# Patient Record
Sex: FEMALE | Race: WHITE | NOT HISPANIC OR LATINO
[De-identification: names, ages, dates, MRNs, and addresses within clinical notes are randomized per-mention and may not be internally consistent; named-entity substitution may affect disease eponyms.]

---

## 2017-08-17 PROBLEM — Z00.00 ENCOUNTER FOR PREVENTIVE HEALTH EXAMINATION: Status: ACTIVE | Noted: 2017-08-17

## 2017-09-19 ENCOUNTER — APPOINTMENT (OUTPATIENT)
Dept: SURGERY | Facility: CLINIC | Age: 62
End: 2017-09-19
Payer: COMMERCIAL

## 2017-09-19 VITALS
WEIGHT: 110 LBS | HEIGHT: 60 IN | SYSTOLIC BLOOD PRESSURE: 149 MMHG | BODY MASS INDEX: 21.6 KG/M2 | HEART RATE: 76 BPM | DIASTOLIC BLOOD PRESSURE: 98 MMHG

## 2017-09-19 DIAGNOSIS — R22.0 LOCALIZED SWELLING, MASS AND LUMP, HEAD: ICD-10-CM

## 2017-09-19 DIAGNOSIS — Z86.59 PERSONAL HISTORY OF OTHER MENTAL AND BEHAVIORAL DISORDERS: ICD-10-CM

## 2017-09-19 DIAGNOSIS — Z78.9 OTHER SPECIFIED HEALTH STATUS: ICD-10-CM

## 2017-09-19 PROCEDURE — 99243 OFF/OP CNSLTJ NEW/EST LOW 30: CPT

## 2017-09-19 RX ORDER — CLONAZEPAM 1 MG/1
1 TABLET ORAL
Qty: 90 | Refills: 0 | Status: ACTIVE | COMMUNITY
Start: 2017-08-10

## 2017-09-19 RX ORDER — FLUOXETINE HYDROCHLORIDE 20 MG/1
20 CAPSULE ORAL
Qty: 90 | Refills: 0 | Status: ACTIVE | COMMUNITY
Start: 2017-07-31

## 2018-03-15 ENCOUNTER — OTHER (OUTPATIENT)
Age: 63
End: 2018-03-15

## 2018-03-15 ENCOUNTER — APPOINTMENT (OUTPATIENT)
Dept: SURGERY | Facility: CLINIC | Age: 63
End: 2018-03-15

## 2019-11-04 ENCOUNTER — OTHER (OUTPATIENT)
Age: 64
End: 2019-11-04

## 2022-07-24 ENCOUNTER — NON-APPOINTMENT (OUTPATIENT)
Age: 67
End: 2022-07-24

## 2023-01-04 ENCOUNTER — OFFICE (OUTPATIENT)
Dept: URBAN - METROPOLITAN AREA CLINIC 90 | Facility: CLINIC | Age: 68
Setting detail: OPHTHALMOLOGY
End: 2023-01-04
Payer: MEDICARE

## 2023-01-04 DIAGNOSIS — H01.005: ICD-10-CM

## 2023-01-04 DIAGNOSIS — H35.363: ICD-10-CM

## 2023-01-04 DIAGNOSIS — H01.002: ICD-10-CM

## 2023-01-04 DIAGNOSIS — H01.001: ICD-10-CM

## 2023-01-04 DIAGNOSIS — H43.813: ICD-10-CM

## 2023-01-04 DIAGNOSIS — H16.222: ICD-10-CM

## 2023-01-04 DIAGNOSIS — H25.12: ICD-10-CM

## 2023-01-04 DIAGNOSIS — H01.004: ICD-10-CM

## 2023-01-04 PROCEDURE — 92134 CPTRZ OPH DX IMG PST SGM RTA: CPT | Performed by: OPHTHALMOLOGY

## 2023-01-04 PROCEDURE — 92014 COMPRE OPH EXAM EST PT 1/>: CPT | Performed by: OPHTHALMOLOGY

## 2023-01-04 ASSESSMENT — VISUAL ACUITY
OS_BCVA: 20/20-2
OD_BCVA: 20/100+1

## 2023-01-04 ASSESSMENT — TONOMETRY
OS_IOP_MMHG: 18
OD_IOP_MMHG: 18

## 2023-01-04 ASSESSMENT — REFRACTION_AUTOREFRACTION
OD_SPHERE: -0.25
OS_AXIS: 180
OD_CYLINDER: -0.75
OS_SPHERE: -2.00
OD_AXIS: 174
OS_CYLINDER: SPHERE

## 2023-01-04 ASSESSMENT — LID EXAM ASSESSMENTS
OD_BLEPHARITIS: 1+
OS_BLEPHARITIS: 1+

## 2023-01-04 ASSESSMENT — REFRACTION_MANIFEST
OS_CYLINDER: SPH
OS_VA1: 20/20
OD_CYLINDER: SPH
OS_SPHERE: -0.50
OD_VA1: 20/20-2
OD_SPHERE: -1.75
OS_AXIS: 100
OS_CYLINDER: SPHERE
OD_VA1: 20/60-2
OS_VA1: 20/20
OS_SPHERE: -1.50
OD_AXIS: 015
OS_VA1: 20/20
OD_VA1: 20/20
OS_SPHERE: -1.25
OD_SPHERE: -5.75
OD_CYLINDER: -1.25
OD_VA1: 20/25
OS_VA1: 20/25
OS_SPHERE: PLANO
OD_CYLINDER: -1.00
OD_SPHERE: -2.25
OD_AXIS: 175
OS_AXIS: 175
OD_AXIS: 100
OU_VA: 20/20
OS_CYLINDER: +0.50
OS_CYLINDER: -0.25
OD_SPHERE: -0.25
OD_CYLINDER: SPHERE

## 2023-01-04 ASSESSMENT — REFRACTION_CURRENTRX
OD_OVR_VA: 20/
OD_VPRISM_DIRECTION: SV
OS_SPHERE: -0.50
OS_CYLINDER: SPHERE
OS_OVR_VA: 20/
OS_OVR_VA: 20/
OS_VPRISM_DIRECTION: SV
OD_SPHERE: -2.00
OS_VPRISM_DIRECTION: SV
OD_CYLINDER: SPH
OS_CYLINDER: SPH
OD_OVR_VA: 20/
OS_SPHERE: +1.50
OD_CYLINDER: SPHERE
OD_SPHERE: +1.50
OD_VPRISM_DIRECTION: SV

## 2023-01-04 ASSESSMENT — KERATOMETRY
OD_AXISANGLE_DEGREES: 091
OS_K1POWER_DIOPTERS: 42.75
OD_K1POWER_DIOPTERS: 42.25
OS_K2POWER_DIOPTERS: 43.50
OS_AXISANGLE_DEGREES: 099
METHOD_AUTO_MANUAL: AUTO
OD_K2POWER_DIOPTERS: 43.50

## 2023-01-04 ASSESSMENT — CONFRONTATIONAL VISUAL FIELD TEST (CVF)
OS_FINDINGS: FULL
OD_FINDINGS: FULL

## 2023-01-04 ASSESSMENT — SPHEQUIV_DERIVED
OS_SPHEQUIV: -1.625
OD_SPHEQUIV: -0.875
OD_SPHEQUIV: -0.625
OD_SPHEQUIV: -6.25

## 2023-01-04 ASSESSMENT — AXIALLENGTH_DERIVED
OD_AL: 26.5958
OD_AL: 24.1765
OS_AL: 24.3888
OD_AL: 24.0747

## 2023-01-04 ASSESSMENT — SUPERFICIAL PUNCTATE KERATITIS (SPK): OS_SPK: T

## 2023-03-14 ENCOUNTER — OFFICE (OUTPATIENT)
Dept: URBAN - METROPOLITAN AREA CLINIC 90 | Facility: CLINIC | Age: 68
Setting detail: OPHTHALMOLOGY
End: 2023-03-14
Payer: MEDICARE

## 2023-03-14 DIAGNOSIS — H25.12: ICD-10-CM

## 2023-03-14 PROCEDURE — 92136 OPHTHALMIC BIOMETRY: CPT | Performed by: OPHTHALMOLOGY

## 2023-03-14 ASSESSMENT — AXIALLENGTH_DERIVED
OD_AL: 24.0327
OD_AL: 26.4219
OS_AL: 24.1941
OS_AL: 24.5054
OD_AL: 24.0327

## 2023-03-14 ASSESSMENT — REFRACTION_MANIFEST
OD_CYLINDER: SPH
OS_CYLINDER: -0.25
OD_AXIS: 100
OS_SPHERE: -1.50
OD_VA1: 20/60-2
OD_VA1: 20/20-2
OD_CYLINDER: -1.25
OD_VA1: 20/25
OS_CYLINDER: SPHERE
OD_SPHERE: -2.25
OS_AXIS: 100
OS_CYLINDER: SPH
OU_VA: 20/20
OD_SPHERE: -1.75
OS_VA1: 20/25
OD_SPHERE: -0.25
OS_SPHERE: -1.25
OD_AXIS: 015
OD_AXIS: 175
OS_VA1: 20/20
OD_SPHERE: -5.75
OD_CYLINDER: -1.00
OD_CYLINDER: SPHERE
OS_SPHERE: PLANO
OS_AXIS: 175
OS_VA1: 20/20
OS_CYLINDER: +0.50
OS_SPHERE: -0.50
OD_VA1: 20/20
OS_VA1: 20/20

## 2023-03-14 ASSESSMENT — REFRACTION_CURRENTRX
OD_OVR_VA: 20/
OS_SPHERE: +1.50
OD_OVR_VA: 20/
OD_VPRISM_DIRECTION: SV
OS_CYLINDER: SPHERE
OS_SPHERE: -0.50
OS_VPRISM_DIRECTION: SV
OD_CYLINDER: SPH
OS_OVR_VA: 20/
OS_CYLINDER: SPH
OD_VPRISM_DIRECTION: SV
OS_OVR_VA: 20/
OD_SPHERE: -2.00
OD_SPHERE: +1.50
OS_VPRISM_DIRECTION: SV
OD_CYLINDER: SPHERE

## 2023-03-14 ASSESSMENT — SPHEQUIV_DERIVED
OD_SPHEQUIV: -0.875
OS_SPHEQUIV: -1.625
OD_SPHEQUIV: -6.25
OS_SPHEQUIV: -2.375
OD_SPHEQUIV: -0.875

## 2023-03-14 ASSESSMENT — KERATOMETRY
OD_AXISANGLE_DEGREES: 088
OS_AXISANGLE_DEGREES: 096
OS_K1POWER_DIOPTERS: 43.25
OD_K2POWER_DIOPTERS: 43.75
OD_K1POWER_DIOPTERS: 42.75
METHOD_AUTO_MANUAL: AUTO
OS_K2POWER_DIOPTERS: 44.00

## 2023-03-14 ASSESSMENT — REFRACTION_AUTOREFRACTION
OD_AXIS: 176
OS_CYLINDER: -0.25
OS_SPHERE: -2.25
OD_CYLINDER: -0.75
OD_SPHERE: -0.50
OS_AXIS: 180

## 2023-03-14 ASSESSMENT — VISUAL ACUITY
OS_BCVA: 20/20-2
OD_BCVA: 20/200

## 2023-03-20 ENCOUNTER — AMBULATORY SURGERY CENTER (OUTPATIENT)
Dept: URBAN - METROPOLITAN AREA SURGERY 25 | Facility: SURGERY | Age: 68
Setting detail: OPHTHALMOLOGY
End: 2023-03-20
Payer: MEDICARE

## 2023-03-20 DIAGNOSIS — H25.22: ICD-10-CM

## 2023-03-20 PROCEDURE — 66984 XCAPSL CTRC RMVL W/O ECP: CPT | Performed by: OPHTHALMOLOGY

## 2023-03-21 ENCOUNTER — RX ONLY (RX ONLY)
Age: 68
End: 2023-03-21

## 2023-03-21 ENCOUNTER — OFFICE (OUTPATIENT)
Dept: URBAN - METROPOLITAN AREA CLINIC 90 | Facility: CLINIC | Age: 68
Setting detail: OPHTHALMOLOGY
End: 2023-03-21
Payer: MEDICARE

## 2023-03-21 DIAGNOSIS — Z96.1: ICD-10-CM

## 2023-03-21 PROCEDURE — 99024 POSTOP FOLLOW-UP VISIT: CPT | Performed by: OPHTHALMOLOGY

## 2023-03-21 ASSESSMENT — REFRACTION_CURRENTRX
OS_OVR_VA: 20/
OS_OVR_VA: 20/
OD_VPRISM_DIRECTION: SV
OS_VPRISM_DIRECTION: SV
OD_SPHERE: -2.00
OS_CYLINDER: SPHERE
OD_CYLINDER: SPHERE
OD_SPHERE: +1.50
OS_VPRISM_DIRECTION: SV
OS_SPHERE: -0.50
OS_SPHERE: +1.50
OD_CYLINDER: SPH
OD_OVR_VA: 20/
OS_CYLINDER: SPH
OD_OVR_VA: 20/
OD_VPRISM_DIRECTION: SV

## 2023-03-21 ASSESSMENT — REFRACTION_MANIFEST
OD_CYLINDER: SPHERE
OS_VA1: 20/20
OD_VA1: 20/20
OD_AXIS: 015
OS_CYLINDER: SPH
OS_SPHERE: PLANO
OD_VA1: 20/60-2
OD_SPHERE: -0.25
OD_AXIS: 175
OS_VA1: 20/25
OD_SPHERE: -5.75
OS_CYLINDER: +0.50
OS_SPHERE: -1.25
OD_CYLINDER: -1.25
OD_VA1: 20/20-2
OD_AXIS: 100
OS_VA1: 20/20
OD_VA1: 20/25
OU_VA: 20/20
OS_AXIS: 175
OD_CYLINDER: SPH
OS_VA1: 20/20
OS_CYLINDER: -0.25
OD_SPHERE: -1.75
OD_CYLINDER: -1.00
OS_SPHERE: -0.50
OS_CYLINDER: SPHERE
OS_AXIS: 100
OD_SPHERE: -2.25
OS_SPHERE: -1.50

## 2023-03-21 ASSESSMENT — REFRACTION_AUTOREFRACTION
OS_SPHERE: -2.25
OD_CYLINDER: -0.75
OD_AXIS: 176
OS_AXIS: 180
OS_CYLINDER: -0.25
OD_SPHERE: -0.50

## 2023-03-21 ASSESSMENT — KERATOMETRY
OD_K2POWER_DIOPTERS: 43.75
OD_AXISANGLE_DEGREES: 088
OD_K1POWER_DIOPTERS: 42.75
OS_K1POWER_DIOPTERS: 43.25
METHOD_AUTO_MANUAL: AUTO
OS_AXISANGLE_DEGREES: 096
OS_K2POWER_DIOPTERS: 44.00

## 2023-03-21 ASSESSMENT — LID EXAM ASSESSMENTS
OD_BLEPHARITIS: 1+
OS_BLEPHARITIS: 1+

## 2023-03-21 ASSESSMENT — VISUAL ACUITY
OD_BCVA: 20/60-2
OS_BCVA: 20/20-2

## 2023-03-21 ASSESSMENT — SPHEQUIV_DERIVED
OD_SPHEQUIV: -6.25
OD_SPHEQUIV: -0.875
OS_SPHEQUIV: -1.625
OD_SPHEQUIV: -0.875
OS_SPHEQUIV: -2.375

## 2023-03-21 ASSESSMENT — AXIALLENGTH_DERIVED
OD_AL: 26.4219
OD_AL: 24.0327
OS_AL: 24.5054
OS_AL: 24.1941
OD_AL: 24.0327

## 2023-03-21 ASSESSMENT — TONOMETRY: OS_IOP_MMHG: 17

## 2023-03-21 ASSESSMENT — CORNEAL EDEMA - FOLDS/STRIAE: OS_FOLDSSTRIAE: 1+

## 2023-03-21 ASSESSMENT — SUPERFICIAL PUNCTATE KERATITIS (SPK): OS_SPK: T

## 2023-03-28 ENCOUNTER — OFFICE (OUTPATIENT)
Dept: URBAN - METROPOLITAN AREA CLINIC 90 | Facility: CLINIC | Age: 68
Setting detail: OPHTHALMOLOGY
End: 2023-03-28
Payer: MEDICARE

## 2023-03-28 DIAGNOSIS — Z96.1: ICD-10-CM

## 2023-03-28 PROCEDURE — 99024 POSTOP FOLLOW-UP VISIT: CPT | Performed by: OPHTHALMOLOGY

## 2023-03-28 ASSESSMENT — KERATOMETRY
OS_K2POWER_DIOPTERS: 44.00
OS_AXISANGLE_DEGREES: 096
OD_K2POWER_DIOPTERS: 43.75
OD_AXISANGLE_DEGREES: 088
OD_K1POWER_DIOPTERS: 42.75
METHOD_AUTO_MANUAL: AUTO
OS_K1POWER_DIOPTERS: 43.25

## 2023-03-28 ASSESSMENT — REFRACTION_MANIFEST
OS_CYLINDER: +0.50
OD_AXIS: 100
OD_CYLINDER: -1.25
OS_VA1: 20/25
OD_VA1: 20/20
OS_SPHERE: -1.50
OD_SPHERE: -5.75
OD_SPHERE: -2.25
OS_SPHERE: -0.50
OD_AXIS: 015
OS_VA1: 20/20
OD_CYLINDER: SPH
OD_AXIS: 175
OS_AXIS: 175
OS_CYLINDER: SPHERE
OU_VA: 20/20
OS_CYLINDER: SPH
OD_VA1: 20/25
OS_VA1: 20/20
OS_AXIS: 100
OD_SPHERE: -0.25
OD_SPHERE: -1.75
OD_CYLINDER: -1.00
OD_CYLINDER: SPHERE
OS_VA1: 20/20
OS_CYLINDER: -0.25
OS_SPHERE: -1.25
OD_VA1: 20/60-2
OD_VA1: 20/20-2
OS_SPHERE: PLANO

## 2023-03-28 ASSESSMENT — AXIALLENGTH_DERIVED
OS_AL: 24.1941
OD_AL: 26.4219
OD_AL: 24.0327
OS_AL: 24.5054
OD_AL: 24.0327

## 2023-03-28 ASSESSMENT — LID EXAM ASSESSMENTS
OD_BLEPHARITIS: 1+
OS_BLEPHARITIS: 1+

## 2023-03-28 ASSESSMENT — CORNEAL EDEMA - FOLDS/STRIAE: OS_FOLDSSTRIAE: 1+

## 2023-03-28 ASSESSMENT — TONOMETRY
OD_IOP_MMHG: 18
OS_IOP_MMHG: 20

## 2023-03-28 ASSESSMENT — REFRACTION_CURRENTRX
OD_OVR_VA: 20/
OD_OVR_VA: 20/
OS_SPHERE: -0.50
OS_SPHERE: +1.50
OD_CYLINDER: SPHERE
OD_VPRISM_DIRECTION: SV
OS_OVR_VA: 20/
OS_VPRISM_DIRECTION: SV
OS_OVR_VA: 20/
OS_CYLINDER: SPH
OS_VPRISM_DIRECTION: SV
OD_SPHERE: +1.50
OD_SPHERE: -2.00
OS_CYLINDER: SPHERE
OD_CYLINDER: SPH
OD_VPRISM_DIRECTION: SV

## 2023-03-28 ASSESSMENT — VISUAL ACUITY
OD_BCVA: 20/25-2
OS_BCVA: 20/20-2

## 2023-03-28 ASSESSMENT — SPHEQUIV_DERIVED
OD_SPHEQUIV: -6.25
OD_SPHEQUIV: -0.875
OS_SPHEQUIV: -1.625
OD_SPHEQUIV: -0.875
OS_SPHEQUIV: -2.375

## 2023-03-28 ASSESSMENT — SUPERFICIAL PUNCTATE KERATITIS (SPK): OS_SPK: T

## 2023-03-28 ASSESSMENT — CONFRONTATIONAL VISUAL FIELD TEST (CVF)
OS_FINDINGS: FULL
OD_FINDINGS: FULL

## 2023-03-28 ASSESSMENT — REFRACTION_AUTOREFRACTION
OS_AXIS: 180
OD_AXIS: 176
OD_SPHERE: -0.50
OD_CYLINDER: -0.75
OS_CYLINDER: -0.25
OS_SPHERE: -2.25

## 2023-04-18 ENCOUNTER — OFFICE (OUTPATIENT)
Dept: URBAN - METROPOLITAN AREA CLINIC 90 | Facility: CLINIC | Age: 68
Setting detail: OPHTHALMOLOGY
End: 2023-04-18
Payer: MEDICARE

## 2023-04-18 DIAGNOSIS — Z96.1: ICD-10-CM

## 2023-04-18 PROCEDURE — 99024 POSTOP FOLLOW-UP VISIT: CPT | Performed by: OPHTHALMOLOGY

## 2023-04-18 ASSESSMENT — REFRACTION_MANIFEST
OS_SPHERE: -0.75
OD_CYLINDER: -1.00
OD_SPHERE: -0.25
OD_VA1: 20/25
OD_CYLINDER: -1.00
OD_SPHERE: -1.75
OS_CYLINDER: SPHERE
OS_VA1: 20/20
OU_VA: 20/20
OD_VA1: 20/20
OD_SPHERE: -2.25
OD_AXIS: 100
OS_VA1: 20/20
OD_CYLINDER: SPHERE
OS_SPHERE: -0.50
OD_CYLINDER: SPH
OD_SPHERE: -0.50
OD_AXIS: 180
OS_CYLINDER: SPH
OD_AXIS: 175
OS_VA1: 20/25
OS_VA1: 20/20
OS_SPHERE: -1.25
OS_SPHERE: -1.50
OD_AXIS: 015
OS_CYLINDER: +0.50
OS_AXIS: 100
OS_CYLINDER: -1.00
OS_AXIS: 175
OS_VA1: 20/20
OS_CYLINDER: -0.25
OS_SPHERE: PLANO
OD_VA1: 20/20
OD_VA1: 20/20-2
OS_AXIS: 015
OD_CYLINDER: -1.25
OD_VA1: 20/60-2
OD_SPHERE: -5.75

## 2023-04-18 ASSESSMENT — REFRACTION_CURRENTRX
OS_OVR_VA: 20/
OD_SPHERE: +1.50
OS_SPHERE: -0.50
OD_CYLINDER: SPH
OS_VPRISM_DIRECTION: SV
OD_SPHERE: -2.00
OD_VPRISM_DIRECTION: SV
OD_OVR_VA: 20/
OS_SPHERE: +1.50
OD_CYLINDER: SPHERE
OS_VPRISM_DIRECTION: SV
OS_OVR_VA: 20/
OS_CYLINDER: SPH
OS_CYLINDER: SPHERE
OD_VPRISM_DIRECTION: SV
OD_OVR_VA: 20/

## 2023-04-18 ASSESSMENT — AXIALLENGTH_DERIVED
OS_AL: 24.1941
OD_AL: 24.0834
OD_AL: 24.0327
OS_AL: 24.5054
OS_AL: 24.0414
OD_AL: 26.4219
OD_AL: 24.0327

## 2023-04-18 ASSESSMENT — SPHEQUIV_DERIVED
OS_SPHEQUIV: -1.25
OS_SPHEQUIV: -2.375
OS_SPHEQUIV: -1.625
OD_SPHEQUIV: -0.875
OD_SPHEQUIV: -6.25
OD_SPHEQUIV: -0.875
OD_SPHEQUIV: -1

## 2023-04-18 ASSESSMENT — VISUAL ACUITY
OS_BCVA: 20/20
OD_BCVA: 20/25-2

## 2023-04-18 ASSESSMENT — REFRACTION_AUTOREFRACTION
OD_AXIS: 176
OD_SPHERE: -0.50
OD_CYLINDER: -0.75
OS_AXIS: 180
OS_SPHERE: -2.25
OS_CYLINDER: -0.25

## 2023-04-18 ASSESSMENT — CONFRONTATIONAL VISUAL FIELD TEST (CVF)
OS_FINDINGS: FULL
OD_FINDINGS: FULL

## 2023-04-18 ASSESSMENT — KERATOMETRY
OD_AXISANGLE_DEGREES: 088
OD_K2POWER_DIOPTERS: 43.75
OD_K1POWER_DIOPTERS: 42.75
OS_K2POWER_DIOPTERS: 44.00
OS_K1POWER_DIOPTERS: 43.25
OS_AXISANGLE_DEGREES: 096
METHOD_AUTO_MANUAL: AUTO

## 2023-05-23 ENCOUNTER — OFFICE (OUTPATIENT)
Dept: URBAN - METROPOLITAN AREA CLINIC 90 | Facility: CLINIC | Age: 68
Setting detail: OPHTHALMOLOGY
End: 2023-05-23
Payer: MEDICARE

## 2023-05-23 DIAGNOSIS — H40.031: ICD-10-CM

## 2023-05-23 DIAGNOSIS — Z96.1: ICD-10-CM

## 2023-05-23 DIAGNOSIS — H26.491: ICD-10-CM

## 2023-05-23 DIAGNOSIS — H35.363: ICD-10-CM

## 2023-05-23 PROCEDURE — 99024 POSTOP FOLLOW-UP VISIT: CPT | Performed by: OPHTHALMOLOGY

## 2023-05-23 ASSESSMENT — REFRACTION_MANIFEST
OS_SPHERE: -1.50
OU_VA: 20/20
OD_VA1: 20/60-2
OS_SPHERE: -0.50
OD_AXIS: 015
OS_VA1: 20/20
OS_AXIS: 175
OD_SPHERE: -5.75
OD_SPHERE: -1.75
OS_CYLINDER: SPH
OD_CYLINDER: -1.00
OS_SPHERE: PLANO
OD_AXIS: 175
OD_SPHERE: -0.50
OS_CYLINDER: -1.00
OS_VA1: 20/20
OS_VA1: 20/20
OD_CYLINDER: SPHERE
OD_AXIS: 180
OS_CYLINDER: -0.25
OS_CYLINDER: SPHERE
OS_CYLINDER: +0.50
OD_AXIS: 100
OS_AXIS: 015
OS_VA1: 20/25
OS_AXIS: 100
OD_CYLINDER: -1.00
OD_VA1: 20/20
OS_VA1: 20/20
OD_SPHERE: -2.25
OD_VA1: 20/20-2
OD_VA1: 20/20
OS_SPHERE: -1.25
OD_SPHERE: -0.25
OD_VA1: 20/25
OS_SPHERE: -0.75
OD_CYLINDER: -1.25
OD_CYLINDER: SPH

## 2023-05-23 ASSESSMENT — VISUAL ACUITY
OD_BCVA: 20/30+2
OS_BCVA: 20/20-2

## 2023-05-23 ASSESSMENT — TONOMETRY
OS_IOP_MMHG: 12
OD_IOP_MMHG: 12

## 2023-05-23 ASSESSMENT — AXIALLENGTH_DERIVED
OD_AL: 24.2733
OD_AL: 24.325
OS_AL: 24.2307
OD_AL: 26.713
OS_AL: 24.2822
OS_AL: 24.438
OD_AL: 24.2219

## 2023-05-23 ASSESSMENT — SPHEQUIV_DERIVED
OD_SPHEQUIV: -6.25
OD_SPHEQUIV: -1
OD_SPHEQUIV: -0.875
OS_SPHEQUIV: -1.625
OS_SPHEQUIV: -1.25
OS_SPHEQUIV: -1.125
OD_SPHEQUIV: -0.75

## 2023-05-23 ASSESSMENT — REFRACTION_CURRENTRX
OS_VPRISM_DIRECTION: SV
OS_VPRISM_DIRECTION: SV
OD_VPRISM_DIRECTION: SV
OS_OVR_VA: 20/
OD_VPRISM_DIRECTION: SV
OD_OVR_VA: 20/
OD_OVR_VA: 20/
OD_CYLINDER: SPH
OS_CYLINDER: SPHERE
OS_CYLINDER: SPH
OD_CYLINDER: SPHERE
OD_SPHERE: -2.00
OS_SPHERE: +1.50
OD_SPHERE: +1.50
OS_OVR_VA: 20/
OS_SPHERE: -0.50

## 2023-05-23 ASSESSMENT — REFRACTION_AUTOREFRACTION
OD_AXIS: 006
OD_SPHERE: -0.25
OS_CYLINDER: -0.75
OS_AXIS: 008
OS_SPHERE: -0.75
OD_CYLINDER: -1.00

## 2023-05-23 ASSESSMENT — KERATOMETRY
OS_K1POWER_DIOPTERS: 42.50
METHOD_AUTO_MANUAL: AUTO
OS_K2POWER_DIOPTERS: 43.50
OS_AXISANGLE_DEGREES: 100
OD_AXISANGLE_DEGREES: 088
OD_K2POWER_DIOPTERS: 43.00
OD_K1POWER_DIOPTERS: 42.25

## 2023-05-23 ASSESSMENT — CONFRONTATIONAL VISUAL FIELD TEST (CVF)
OD_FINDINGS: FULL
OS_FINDINGS: FULL

## 2023-10-04 ENCOUNTER — EMERGENCY (EMERGENCY)
Facility: HOSPITAL | Age: 68
LOS: 1 days | Discharge: HOME | End: 2023-10-04
Attending: INTERNAL MEDICINE | Admitting: INTERNAL MEDICINE
Payer: MEDICARE

## 2023-10-04 VITALS
SYSTOLIC BLOOD PRESSURE: 137 MMHG | DIASTOLIC BLOOD PRESSURE: 77 MMHG | HEART RATE: 71 BPM | OXYGEN SATURATION: 100 % | RESPIRATION RATE: 17 BRPM

## 2023-10-04 VITALS
DIASTOLIC BLOOD PRESSURE: 85 MMHG | TEMPERATURE: 98 F | HEART RATE: 96 BPM | RESPIRATION RATE: 18 BRPM | WEIGHT: 139.99 LBS | OXYGEN SATURATION: 94 % | SYSTOLIC BLOOD PRESSURE: 151 MMHG

## 2023-10-04 PROCEDURE — 73562 X-RAY EXAM OF KNEE 3: CPT | Mod: 26,LT

## 2023-10-04 PROCEDURE — 99284 EMERGENCY DEPT VISIT MOD MDM: CPT | Mod: 25

## 2023-10-04 PROCEDURE — 99285 EMERGENCY DEPT VISIT HI MDM: CPT | Mod: FS

## 2023-10-04 PROCEDURE — 72125 CT NECK SPINE W/O DYE: CPT | Mod: 26,MA

## 2023-10-04 PROCEDURE — 70450 CT HEAD/BRAIN W/O DYE: CPT | Mod: 26,MA

## 2023-10-04 PROCEDURE — 73562 X-RAY EXAM OF KNEE 3: CPT

## 2023-10-04 PROCEDURE — 70450 CT HEAD/BRAIN W/O DYE: CPT | Mod: MA

## 2023-10-04 PROCEDURE — 70486 CT MAXILLOFACIAL W/O DYE: CPT | Mod: 26,MA

## 2023-10-04 PROCEDURE — 70486 CT MAXILLOFACIAL W/O DYE: CPT | Mod: MA

## 2023-10-04 PROCEDURE — 72125 CT NECK SPINE W/O DYE: CPT | Mod: MA

## 2023-10-04 NOTE — ED PROVIDER NOTE - PATIENT PORTAL LINK FT
You can access the FollowMyHealth Patient Portal offered by [facility] by registering at the following website: [email or web address]. By joining [facility]’s FollowMyHealth portal, you will also be able to view your health information using other applications (apps) compatible with our system.

## 2023-10-04 NOTE — ED PROVIDER NOTE - CLINICAL SUMMARY MEDICAL DECISION MAKING FREE TEXT BOX
68-year-old female past medical history of bipolar disorder type I presents to the ED with complaint of mechanical trip and fall on uneven sidewalk today with head injury and facial abrasions.  Patient also has abrasions to bilateral knees.  She denies any headache, dizziness, nausea vomiting, LOC, anticoagulation use, visual changes or all other complaints.  Patient has been ambulatory and able to bear weight since the fall. PE as noted above. Tdap is UTD.  Knee x-ray results reviewed–no fracture noted.  CTs pending.  Patient offered pain control but she refused. 68-year-old female past medical history of bipolar disorder type I presents to the ED with complaint of mechanical trip and fall on uneven sidewalk today with head injury and facial abrasions.  Patient also has abrasions to bilateral knees.  She denies any headache, dizziness, nausea vomiting, LOC, anticoagulation use, visual changes or all other complaints.  Patient has been ambulatory and able to bear weight since the fall. PE as noted above. Tdap is UTD.  Knee x-ray results reviewed–no fracture noted.  CTs pending.  Patient offered pain control but she refused.    427pm: CT results reviewed- NAF. Will dc with return precautions

## 2023-10-04 NOTE — ED PROVIDER NOTE - ATTENDING APP SHARED VISIT CONTRIBUTION OF CARE
68-year-old female past medical history of bipolar disorder type I presents to the ED with complaint of mechanical trip and fall on uneven sidewalk today with head injury and facial abrasions.  Patient also has abrasions to bilateral knees.  She denies any headache, dizziness, nausea vomiting, LOC, anticoagulation use, visual changes or all other complaints.  Patient has been ambulatory and able to bear weight since the fall. PE as noted above. Tdap is UTD.  Knee x-ray results reviewed–no fracture noted.  CTs pending.  Patient offered pain control but she refused.    427pm: CT results reviewed- NAF. Will dc with return precautions  Dr. Maynard:  I have reviewed and discussed with the PA/ resident the case specifics, including the history, physical assessment, evaluation, conclusion, laboratory results, and medical plan. I agree with the contents, and conclusions. I have personally examined, and interviewed the patient.

## 2023-10-04 NOTE — ED ADULT TRIAGE NOTE - CHIEF COMPLAINT QUOTE
Pt had a trip and fall outside today hitting her face on the ground. Abrasion noted underneath left eye and b/l knees. Pt denies LOC or blood thinners.

## 2023-10-04 NOTE — ED ADULT NURSE NOTE - OBJECTIVE STATEMENT
Pt presents to ED s/p fall.  Reports mechanical trip and fall over uneven sidewalk.  Pt states that she was carrying several items in her hand, when she tripped and fell on to her face and her knees.  Reports head strike with no LOC; denies any blood thinner use.  Left forehead noted to have hematoma; b/l knees noted to have abrasions.  Pt denies any n/v, h/a, visual changes, numbness, tingling.  Tdap up to date.

## 2023-10-04 NOTE — ED PROVIDER NOTE - PHYSICAL EXAMINATION
Gen: Well appearing in NAD.   Eyes: PERRLA,. EOMI. +small superficial abrasion to the left lower eyelid.   ENT: no dental injury noted. No nasal bone TTP.   Head: +left forehead hematoma  Heart: s1/s2, RRR  Lung: CTA b/l, no rib TTP  Abd: soft, NT/ND,  Msk: No C-spine or mid spinal tenderness to palpation. Pelvis stable.  Patient moving bilateral hips. + Tenderness to palpation over the left knee.  Full range of motion of the knee.  2+ pedal pulses.  No neurovascular compromise  Neuro: AAO x3, no focal neuro deficits noted  Skin: +Superficial abrasions to the left knee.   Psych: Alert and oriented

## 2023-10-04 NOTE — ED ADULT NURSE NOTE - NSFALLRISKINTERV_ED_ALL_ED

## 2023-10-04 NOTE — ED PROVIDER NOTE - CARE PLAN
1 Principal Discharge DX:	[facility] (closed head injury), initial encounter  Secondary Diagnosis:	Facial pain  Secondary Diagnosis:	Fall

## 2023-10-07 NOTE — CHART NOTE - NSCHARTNOTEFT_GEN_A_CORE
SW called pt to discuss and assist with follow up care.  Pt is a 67 y/o female presented to ED for fall and head injury.  Pt reports that pt is holding steady and had an appt primary Alivia Bah on 10/6/23.  Worker did not identify any other concerns, continues to remain available to further assist.

## 2023-11-08 ENCOUNTER — OFFICE (OUTPATIENT)
Dept: URBAN - METROPOLITAN AREA CLINIC 90 | Facility: CLINIC | Age: 68
Setting detail: OPHTHALMOLOGY
End: 2023-11-08
Payer: MEDICARE

## 2023-11-08 DIAGNOSIS — Z96.1: ICD-10-CM

## 2023-11-08 DIAGNOSIS — H26.491: ICD-10-CM

## 2023-11-08 DIAGNOSIS — H35.363: ICD-10-CM

## 2023-11-08 PROCEDURE — 92134 CPTRZ OPH DX IMG PST SGM RTA: CPT | Performed by: OPHTHALMOLOGY

## 2023-11-08 PROCEDURE — 92014 COMPRE OPH EXAM EST PT 1/>: CPT | Performed by: OPHTHALMOLOGY

## 2023-11-09 ASSESSMENT — REFRACTION_AUTOREFRACTION
OD_SPHERE: -0.50
OS_AXIS: 004
OS_SPHERE: -1.00
OS_CYLINDER: -0.75
OD_CYLINDER: -1.00
OD_AXIS: 007

## 2023-11-09 ASSESSMENT — REFRACTION_MANIFEST
OD_AXIS: 175
OD_SPHERE: -5.75
OS_SPHERE: -1.25
OD_VA1: 20/60-2
OS_VA1: 20/20
OU_VA: 20/20
OS_SPHERE: -1.50
OD_VA1: 20/20
OS_CYLINDER: SPHERE
OS_AXIS: 175
OD_VA1: 20/20-2
OD_CYLINDER: -1.00
OS_VA1: 20/20
OS_VA1: 20/20
OS_SPHERE: -0.75
OS_CYLINDER: -1.00
OS_CYLINDER: -0.25
OD_CYLINDER: -1.00
OD_CYLINDER: SPH
OD_CYLINDER: SPHERE
OS_SPHERE: -0.50
OS_AXIS: 100
OD_SPHERE: -2.25
OD_SPHERE: -1.75
OD_VA1: 20/25
OD_AXIS: 015
OS_VA1: 20/25
OD_SPHERE: -0.25
OS_AXIS: 015
OD_AXIS: 180
OD_SPHERE: -0.50
OD_VA1: 20/20
OS_CYLINDER: SPH
OS_CYLINDER: +0.50
OD_CYLINDER: -1.25
OS_SPHERE: PLANO
OD_AXIS: 100
OS_VA1: 20/20

## 2023-11-09 ASSESSMENT — REFRACTION_CURRENTRX
OS_VPRISM_DIRECTION: SV
OS_VPRISM_DIRECTION: SV
OD_CYLINDER: SPHERE
OS_SPHERE: -0.50
OS_SPHERE: +1.50
OD_SPHERE: -2.00
OS_CYLINDER: SPHERE
OD_OVR_VA: 20/
OD_OVR_VA: 20/
OD_VPRISM_DIRECTION: SV
OS_CYLINDER: SPH
OS_OVR_VA: 20/
OD_VPRISM_DIRECTION: SV
OD_SPHERE: +1.50
OD_CYLINDER: SPH
OS_OVR_VA: 20/

## 2023-11-09 ASSESSMENT — SPHEQUIV_DERIVED
OS_SPHEQUIV: -1.25
OD_SPHEQUIV: -0.875
OS_SPHEQUIV: -1.375
OD_SPHEQUIV: -6.25
OD_SPHEQUIV: -1
OS_SPHEQUIV: -1.625
OD_SPHEQUIV: -1

## 2024-09-11 ENCOUNTER — OFFICE (OUTPATIENT)
Dept: URBAN - METROPOLITAN AREA CLINIC 90 | Facility: CLINIC | Age: 69
Setting detail: OPHTHALMOLOGY
End: 2024-09-11
Payer: MEDICARE

## 2024-09-11 ENCOUNTER — RX ONLY (RX ONLY)
Age: 69
End: 2024-09-11

## 2024-09-11 DIAGNOSIS — H26.491: ICD-10-CM

## 2024-09-11 DIAGNOSIS — H11.153: ICD-10-CM

## 2024-09-11 DIAGNOSIS — H10.45: ICD-10-CM

## 2024-09-11 DIAGNOSIS — Z96.1: ICD-10-CM

## 2024-09-11 PROCEDURE — 99213 OFFICE O/P EST LOW 20 MIN: CPT | Performed by: OPHTHALMOLOGY

## 2024-09-11 ASSESSMENT — CONFRONTATIONAL VISUAL FIELD TEST (CVF)
OS_FINDINGS: FULL
OD_FINDINGS: FULL

## 2024-11-13 ENCOUNTER — OFFICE (OUTPATIENT)
Age: 69
Setting detail: OPHTHALMOLOGY
End: 2024-11-13
Payer: MEDICARE

## 2024-11-13 DIAGNOSIS — H26.493: ICD-10-CM

## 2024-11-13 DIAGNOSIS — H35.363: ICD-10-CM

## 2024-11-13 DIAGNOSIS — Z96.1: ICD-10-CM

## 2024-11-13 DIAGNOSIS — H11.153: ICD-10-CM

## 2024-11-13 PROBLEM — H40.033 NARROW ANGLE GLAUCOMA SUSPECT; BOTH EYES: Status: RESOLVED | Noted: 2024-11-13 | Resolved: 2024-11-13

## 2024-11-13 PROCEDURE — 92014 COMPRE OPH EXAM EST PT 1/>: CPT | Performed by: OPHTHALMOLOGY

## 2024-11-13 ASSESSMENT — REFRACTION_AUTOREFRACTION
OS_AXIS: 179
OS_SPHERE: -1.00
OS_CYLINDER: -0.75
OD_AXIS: 154
OD_CYLINDER: -0.75
OD_SPHERE: -1.50

## 2024-11-13 ASSESSMENT — REFRACTION_MANIFEST
OS_VA1: 20/20-
OS_AXIS: 015
OD_SPHERE: PLANO
OS_VA1: 20/20
OD_AXIS: 175
OD_CYLINDER: -1.00
OS_VA1: 20/25
OS_CYLINDER: +0.50
OU_VA: 20/20
OD_AXIS: 155
OD_VA1: 20/60-2
OD_VA1: 20/20-2
OS_CYLINDER: -1.00
OD_SPHERE: -0.25
OS_SPHERE: -1.25
OS_CYLINDER: SPHERE
OD_SPHERE: -0.50
OD_VA1: 20/25
OD_CYLINDER: -1.00
OS_SPHERE: -1.00
OD_SPHERE: -1.75
OD_SPHERE: -5.75
OS_CYLINDER: SPH
OD_AXIS: 180
OD_CYLINDER: SPH
OS_AXIS: 100
OD_CYLINDER: SPHERE
OS_SPHERE: -0.75
OS_AXIS: 180
OD_AXIS: 015
OD_CYLINDER: -1.25
OD_SPHERE: -2.25
OS_VA1: 20/20
OS_AXIS: 175
OD_CYLINDER: -1.00
OS_VA1: 20/20
OD_VA1: 20/20
OD_VA1: 20/20
OD_AXIS: 100
OS_CYLINDER: -1.00
OS_SPHERE: -0.50
OD_VA1: 20/20-
OS_CYLINDER: -0.25
OS_VA1: 20/20
OS_SPHERE: -1.50
OS_SPHERE: PLANO

## 2024-11-13 ASSESSMENT — VISUAL ACUITY
OD_BCVA: 20/50+2
OS_BCVA: 20/25-2

## 2024-11-13 ASSESSMENT — CONFRONTATIONAL VISUAL FIELD TEST (CVF)
OD_FINDINGS: FULL
OS_FINDINGS: FULL

## 2024-11-13 ASSESSMENT — REFRACTION_CURRENTRX
OD_OVR_VA: 20/
OS_SPHERE: +1.50
OD_SPHERE: +1.50
OD_VPRISM_DIRECTION: SV
OS_OVR_VA: 20/
OS_VPRISM_DIRECTION: SV
OD_VPRISM_DIRECTION: SV
OS_OVR_VA: 20/
OD_OVR_VA: 20/
OS_CYLINDER: SPH
OS_OVR_VA: 20/
OS_CYLINDER: SPHERE
OD_VPRISM_DIRECTION: SV
OD_SPHERE: -2.00
OD_CYLINDER: SPH
OD_OVR_VA: 20/
OD_SPHERE: +1.50
OD_CYLINDER: -SPH
OS_SPHERE: -0.50
OD_CYLINDER: SPHERE
OS_VPRISM_DIRECTION: SV
OS_CYLINDER: SPH
OS_SPHERE: +1.50

## 2024-11-13 ASSESSMENT — KERATOMETRY
METHOD_AUTO_MANUAL: AUTO
OD_AXISANGLE_DEGREES: 096
OS_K2POWER_DIOPTERS: 43.75
OD_K1POWER_DIOPTERS: 42.50
OD_K2POWER_DIOPTERS: 43.25
OS_K1POWER_DIOPTERS: 42.75
OS_AXISANGLE_DEGREES: 096